# Patient Record
Sex: FEMALE | Race: WHITE
[De-identification: names, ages, dates, MRNs, and addresses within clinical notes are randomized per-mention and may not be internally consistent; named-entity substitution may affect disease eponyms.]

---

## 2018-04-25 ENCOUNTER — HOSPITAL ENCOUNTER (OUTPATIENT)
Dept: HOSPITAL 45 - C.RAD1850 | Age: 38
Discharge: HOME | End: 2018-04-25
Attending: FAMILY MEDICINE
Payer: COMMERCIAL

## 2018-04-25 DIAGNOSIS — R05: ICD-10-CM

## 2018-04-25 DIAGNOSIS — R50.9: Primary | ICD-10-CM

## 2018-04-25 NOTE — DIAGNOSTIC IMAGING REPORT
CHEST 2 VIEWS ROUTINE



CLINICAL HISTORY: FEVER cough



COMPARISON STUDY:  5/21/2017



FINDINGS: Infiltrate right base. Lungs otherwise appear clear. No significant

cardiac enlargement. 



IMPRESSION:  Infiltrate right base 











The above report was generated using voice recognition software.  It may contain

grammatical, syntax or spelling errors.









Electronically signed by:  Sudhir Mendieta M.D.

4/25/2018 2:52 PM



Dictated Date/Time:  4/25/2018 2:51 PM